# Patient Record
Sex: MALE | Race: WHITE | NOT HISPANIC OR LATINO | Employment: FULL TIME | URBAN - NONMETROPOLITAN AREA
[De-identification: names, ages, dates, MRNs, and addresses within clinical notes are randomized per-mention and may not be internally consistent; named-entity substitution may affect disease eponyms.]

---

## 2020-02-11 ENCOUNTER — OFFICE VISIT (OUTPATIENT)
Dept: FAMILY MEDICINE CLINIC | Facility: CLINIC | Age: 45
End: 2020-02-11

## 2020-02-11 VITALS
RESPIRATION RATE: 16 BRPM | HEART RATE: 105 BPM | BODY MASS INDEX: 32.92 KG/M2 | OXYGEN SATURATION: 98 % | TEMPERATURE: 98.7 F | HEIGHT: 68 IN | WEIGHT: 217.2 LBS | DIASTOLIC BLOOD PRESSURE: 82 MMHG | SYSTOLIC BLOOD PRESSURE: 150 MMHG

## 2020-02-11 DIAGNOSIS — M79.641 RIGHT HAND PAIN: Primary | ICD-10-CM

## 2020-02-11 PROCEDURE — 99203 OFFICE O/P NEW LOW 30 MIN: CPT | Performed by: FAMILY MEDICINE

## 2020-02-11 PROCEDURE — 73130 X-RAY EXAM OF HAND: CPT | Performed by: FAMILY MEDICINE

## 2020-02-11 NOTE — PATIENT INSTRUCTIONS
Hand Pain  Many things can cause hand pain. Some common causes are:  · An injury.  · Repeating the same movement with your hand over and over (overuse).  · Osteoporosis.  · Arthritis.  · Lumps in the tendons or joints of the hand and wrist (ganglion cysts).  · Nerve compression syndromes (carpal tunnel syndrome).  · Inflammation of the tendons (tendinitis).  · Infection.  Follow these instructions at home:  Pay attention to any changes in your symptoms. Take these actions to help with your discomfort:  Managing pain, stiffness, and swelling    · Take over-the-counter and prescription medicines only as told by your health care provider.  · Wear a hand splint or support as told by your health care provider.  · If directed, put ice on the affected area:  ? Put ice in a plastic bag.  ? Place a towel between your skin and the bag.  ? Leave the ice on for 20 minutes, 2-3 times a day.  Activity  · Take breaks from repetitive activity often.  · Avoid activities that make your pain worse.  · Minimize stress on your hands and wrists as much as possible.  · Do stretches or exercises as told by your health care provider.  · Do not do activities that make your pain worse.  Contact a health care provider if:  · Your pain does not get better after a few days of self-care.  · Your pain gets worse.  · Your pain affects your ability to do your daily activities.  Get help right away if:  · Your hand becomes warm, red, or swollen.  · Your hand is numb or tingling.  · Your hand is extremely swollen or deformed.  · Your hand or fingers turn white or blue.  · You cannot move your hand, wrist, or fingers.  Summary  · Many things can cause hand pain.  · Contact your health care provider if your pain does not get better after a few days of self care.  · Minimize stress on your hands and wrists as much as possible.  · Do not do activities that make your pain worse.  This information is not intended to replace advice given to you by your  health care provider. Make sure you discuss any questions you have with your health care provider.  Document Released: 01/13/2017 Document Revised: 09/13/2019 Document Reviewed: 09/13/2019  [employer] Patient Education © 2019 [employer]

## 2020-02-11 NOTE — PROGRESS NOTES
Subjective   Ottoniel Coles is a 44 y.o. male. Presents today to establish care with you. Here to be evaluated for right hand pain.     History of Present Illness      New patient to me    Right hand pain-he has been dealing with this for a few months and says that his pain is mostly in the palmar aspect of his right hand and will extend to the third and fourth digit.  It is intermittent.  Sharp and stinging.  And relieved sometimes with flicking of the hand but not always.  He is not taking any medications for it.  He had an accident several years ago where he crushed part of his hand.  He has not had any hand surgery.  He did have an instance of trigger finger in the second digit which has been relieved with a corticosteroid injection.  He works as a right-handed .    The following portions of the patient's history were reviewed and updated as appropriate: allergies, current medications, past family history, past medical history, past social history, past surgical history and problem list.    Review of Systems   Constitutional: Negative for activity change, appetite change, fatigue and fever.   HENT: Negative for ear pain, facial swelling and sore throat.    Eyes: Negative for discharge and itching.   Respiratory: Negative for cough, chest tightness and shortness of breath.    Cardiovascular: Negative for chest pain and palpitations.   Gastrointestinal: Negative for abdominal distention and constipation.   Endocrine: Negative for polydipsia, polyphagia and polyuria.   Genitourinary: Negative for difficulty urinating and flank pain.   Musculoskeletal: Negative for arthralgias and back pain.        Hand pain (right)   Skin: Negative for color change, rash and wound.   Allergic/Immunologic: Negative for environmental allergies and food allergies.   Neurological: Negative for weakness and numbness.   Hematological: Negative for adenopathy. Does not bruise/bleed easily.   Psychiatric/Behavioral: Negative for  decreased concentration and dysphoric mood. The patient is not nervous/anxious.    All other systems reviewed and are negative.      Objective   Physical Exam   Constitutional: He is oriented to person, place, and time. He appears well-developed and well-nourished. No distress.   HENT:   Mouth/Throat: Oropharynx is clear and moist. No oropharyngeal exudate.   Eyes: Conjunctivae are normal. Right eye exhibits no discharge. Left eye exhibits no discharge.   Neck: Neck supple.   Cardiovascular: Normal rate, regular rhythm and normal heart sounds. Exam reveals no gallop and no friction rub.   No murmur heard.  Pulmonary/Chest: Effort normal and breath sounds normal. He has no wheezes. He has no rales.   Abdominal: Soft. Bowel sounds are normal. He exhibits no distension. There is no tenderness.   Musculoskeletal: He exhibits no edema or deformity.   Lymphadenopathy:     He has no cervical adenopathy.   Neurological: He is alert and oriented to person, place, and time.   Skin: Skin is warm and dry. No rash noted.   Psychiatric: He has a normal mood and affect. His behavior is normal.   Nursing note and vitals reviewed.  XR hand 3+ view right : Normal appearance.  Nothing acute.    Assessment/Plan   Ottoniel was seen today for establish care and hand pain.    Diagnoses and all orders for this visit:    Right hand pain  -     XR Hand 3+ View Right      Will get that over to the radiologist to review as well.  My thoughts could be carpal tunnel or nerve damage from the previous injury.  Once I get the results back I will probably either pursue an MRI and/or EMG studies and then possibly referral to hand surgery.

## 2020-02-13 DIAGNOSIS — M79.641 RIGHT HAND PAIN: Primary | ICD-10-CM

## 2020-02-13 DIAGNOSIS — M79.2 NEUROPATHIC PAIN: ICD-10-CM

## 2020-02-19 ENCOUNTER — TELEPHONE (OUTPATIENT)
Dept: FAMILY MEDICINE CLINIC | Facility: CLINIC | Age: 45
End: 2020-02-19

## 2020-02-19 NOTE — TELEPHONE ENCOUNTER
Left the message with the patient voice mail.  Hipaa checked.  We have attempted to contact him on many occasions. I asked him to call the office.      ----- Message from Harsh Beasley MD sent at 2/13/2020  5:56 PM EST -----  The right hand x-ray is completely normal.  I have ordered the EMG so be on the look out for someone to call you for that.

## 2020-03-12 ENCOUNTER — HOSPITAL ENCOUNTER (OUTPATIENT)
Dept: INFUSION THERAPY | Facility: HOSPITAL | Age: 45
Discharge: HOME | End: 2020-03-12
Admitting: PSYCHIATRY & NEUROLOGY

## 2020-03-12 DIAGNOSIS — M79.2 NEUROPATHIC PAIN: Primary | ICD-10-CM

## 2020-03-12 DIAGNOSIS — M79.641 RIGHT HAND PAIN: ICD-10-CM

## 2020-03-12 DIAGNOSIS — M79.2 NEUROPATHIC PAIN: ICD-10-CM

## 2020-03-12 PROCEDURE — 95886 MUSC TEST DONE W/N TEST COMP: CPT

## 2020-03-12 PROCEDURE — 95909 NRV CNDJ TST 5-6 STUDIES: CPT

## 2020-03-12 PROCEDURE — 95886 MUSC TEST DONE W/N TEST COMP: CPT | Performed by: PSYCHIATRY & NEUROLOGY

## 2020-03-12 PROCEDURE — 95909 NRV CNDJ TST 5-6 STUDIES: CPT | Performed by: PSYCHIATRY & NEUROLOGY

## 2020-03-12 NOTE — PROCEDURES
EMG REPORT    Indication: Right upper extremity pain and numbness    Findings: Right median, ulnar, and radial sensory study showed normal latencies and amplitudes.  Right median motor study showed normal distal latency velocity and amplitude.  Right ulnar motor study showed normal distal latency and amplitude.    Concentric needle EMG of the right deltoid, triceps, brachioradialis, extensor digitorum, and first dorsal interosseous muscles showed no abnormality.    Impression: Normal EMG and nerve conduction studies of the right upper extremity.       Stephen Montgomery M.D.